# Patient Record
Sex: FEMALE | Race: AMERICAN INDIAN OR ALASKA NATIVE
[De-identification: names, ages, dates, MRNs, and addresses within clinical notes are randomized per-mention and may not be internally consistent; named-entity substitution may affect disease eponyms.]

---

## 2018-07-14 ENCOUNTER — HOSPITAL ENCOUNTER (EMERGENCY)
Dept: HOSPITAL 5 - ED | Age: 23
LOS: 1 days | Discharge: LEFT BEFORE DISCHARGE | End: 2018-07-15
Payer: MEDICAID

## 2018-07-14 DIAGNOSIS — Z53.21: ICD-10-CM

## 2018-07-14 DIAGNOSIS — R10.9: Primary | ICD-10-CM

## 2018-07-14 PROCEDURE — 81001 URINALYSIS AUTO W/SCOPE: CPT

## 2018-07-14 PROCEDURE — 85007 BL SMEAR W/DIFF WBC COUNT: CPT

## 2018-07-14 PROCEDURE — 83690 ASSAY OF LIPASE: CPT

## 2018-07-14 PROCEDURE — 36415 COLL VENOUS BLD VENIPUNCTURE: CPT

## 2018-07-14 PROCEDURE — 80053 COMPREHEN METABOLIC PANEL: CPT

## 2018-07-14 PROCEDURE — 84703 CHORIONIC GONADOTROPIN ASSAY: CPT

## 2018-07-14 PROCEDURE — 85025 COMPLETE CBC W/AUTO DIFF WBC: CPT

## 2018-07-15 VITALS — DIASTOLIC BLOOD PRESSURE: 77 MMHG | SYSTOLIC BLOOD PRESSURE: 121 MMHG

## 2018-07-15 LAB
%HYPO/RBC NFR BLD AUTO: (no result) %
ALBUMIN SERPL-MCNC: 4.5 G/DL (ref 3.9–5)
ALT SERPL-CCNC: 17 UNITS/L (ref 7–56)
ANISOCYTOSIS BLD QL SMEAR: (no result)
BAND NEUTROPHILS # (MANUAL): 0 K/MM3
BILIRUB UR QL STRIP: (no result)
BLOOD UR QL VISUAL: (no result)
BUN SERPL-MCNC: 10 MG/DL (ref 7–17)
BUN/CREAT SERPL: 14 %
CALCIUM SERPL-MCNC: 9.6 MG/DL (ref 8.4–10.2)
HCT VFR BLD CALC: 42.7 % (ref 30.3–42.9)
HEMOLYSIS INDEX: 6
HGB BLD-MCNC: 14.5 GM/DL (ref 10.1–14.3)
MCH RBC QN AUTO: 33 PG (ref 28–32)
MCHC RBC AUTO-ENTMCNC: 34 % (ref 30–34)
MCV RBC AUTO: 98 FL (ref 79–97)
MUCOUS THREADS #/AREA URNS HPF: (no result) /HPF
MYELOCYTES # (MANUAL): 0 K/MM3
PH UR STRIP: 5 [PH] (ref 5–7)
PLATELET # BLD: 204 K/MM3 (ref 140–440)
PROMYELOCYTES # (MANUAL): 0 K/MM3
PROT UR STRIP-MCNC: (no result) MG/DL
RBC # BLD AUTO: 4.34 M/MM3 (ref 3.65–5.03)
RBC #/AREA URNS HPF: 3 /HPF (ref 0–6)
TOTAL CELLS COUNTED BLD: 100
UROBILINOGEN UR-MCNC: < 2 MG/DL (ref ?–2)
WBC #/AREA URNS HPF: 1 /HPF (ref 0–6)

## 2019-08-02 ENCOUNTER — HOSPITAL ENCOUNTER (EMERGENCY)
Dept: HOSPITAL 5 - ED | Age: 24
Discharge: HOME | End: 2019-08-02
Payer: SELF-PAY

## 2019-08-02 VITALS — DIASTOLIC BLOOD PRESSURE: 71 MMHG | SYSTOLIC BLOOD PRESSURE: 115 MMHG

## 2019-08-02 DIAGNOSIS — Z90.49: ICD-10-CM

## 2019-08-02 DIAGNOSIS — E16.2: ICD-10-CM

## 2019-08-02 DIAGNOSIS — F17.200: ICD-10-CM

## 2019-08-02 DIAGNOSIS — Z79.899: ICD-10-CM

## 2019-08-02 DIAGNOSIS — Z88.6: ICD-10-CM

## 2019-08-02 DIAGNOSIS — Z98.890: ICD-10-CM

## 2019-08-02 DIAGNOSIS — J45.909: Primary | ICD-10-CM

## 2019-08-02 LAB
ALBUMIN SERPL-MCNC: 4.1 G/DL (ref 3.9–5)
ALT SERPL-CCNC: 15 UNITS/L (ref 7–56)
BASOPHILS # (AUTO): 0 K/MM3 (ref 0–0.1)
BASOPHILS NFR BLD AUTO: 0.4 % (ref 0–1.8)
BUN SERPL-MCNC: 7 MG/DL (ref 7–17)
BUN/CREAT SERPL: 9 %
CALCIUM SERPL-MCNC: 8.9 MG/DL (ref 8.4–10.2)
EOSINOPHIL # BLD AUTO: 0.1 K/MM3 (ref 0–0.4)
EOSINOPHIL NFR BLD AUTO: 1.2 % (ref 0–4.3)
HCT VFR BLD CALC: 38.1 % (ref 30.3–42.9)
HEMOLYSIS INDEX: 9
HGB BLD-MCNC: 13 GM/DL (ref 10.1–14.3)
LYMPHOCYTES # BLD AUTO: 0.6 K/MM3 (ref 1.2–5.4)
LYMPHOCYTES NFR BLD AUTO: 11.6 % (ref 13.4–35)
MCHC RBC AUTO-ENTMCNC: 34 % (ref 30–34)
MCV RBC AUTO: 97 FL (ref 79–97)
MONOCYTES # (AUTO): 0.3 K/MM3 (ref 0–0.8)
MONOCYTES % (AUTO): 6.7 % (ref 0–7.3)
PLATELET # BLD: 182 K/MM3 (ref 140–440)
RBC # BLD AUTO: 3.94 M/MM3 (ref 3.65–5.03)

## 2019-08-02 PROCEDURE — 82140 ASSAY OF AMMONIA: CPT

## 2019-08-02 PROCEDURE — 71045 X-RAY EXAM CHEST 1 VIEW: CPT

## 2019-08-02 PROCEDURE — 80053 COMPREHEN METABOLIC PANEL: CPT

## 2019-08-02 PROCEDURE — 36415 COLL VENOUS BLD VENIPUNCTURE: CPT

## 2019-08-02 PROCEDURE — 96375 TX/PRO/DX INJ NEW DRUG ADDON: CPT

## 2019-08-02 PROCEDURE — 85025 COMPLETE CBC W/AUTO DIFF WBC: CPT

## 2019-08-02 PROCEDURE — 87040 BLOOD CULTURE FOR BACTERIA: CPT

## 2019-08-02 PROCEDURE — 99284 EMERGENCY DEPT VISIT MOD MDM: CPT

## 2019-08-02 PROCEDURE — 96365 THER/PROPH/DIAG IV INF INIT: CPT

## 2019-08-02 PROCEDURE — 94644 CONT INHLJ TX 1ST HOUR: CPT

## 2019-08-02 NOTE — EMERGENCY DEPARTMENT REPORT
ED Shortness of Breath HPI





- General


Chief Complaint: Dyspnea/Respdistress


Stated Complaint: COUGH


Time Seen by Provider: 08/02/19 20:27


Source: patient, EMS


Mode of arrival: Stretcher


Limitations: No Limitations





- History of Present Illness


Initial Comments: 





Patient is 24-year-old female with history of asthma.  Patient brought to the 

emergency room via EMS with difficulty breathing and cough for the last 3 days. 

Patient is a heavy smoker.  Patient also found to be having a temperature of 

100.4.  She denied any chest pain.  No nausea or vomiting.


MD Complaint: shortness of breath, cough


-: days(s) (3)


Severity: moderate


Known History Of: asthma


Context: recent URI





- Related Data


                                  Previous Rx's











 Medication  Instructions  Recorded  Last Taken  Type


 


HYDROcodone/APAP 5-325 [[place] 1 each PO Q6HR PRN #20 tablet 10/27/18 Unknown Rx





5/325]    


 


metroNIDAZOLE [Flagyl] 500 mg PO Q12HR #14 tab 01/04/19 Unknown Rx


 


traMADol [Ultram] 50 mg PO Q6HR PRN #10 tablet 01/04/19 Unknown Rx











                                    Allergies











Allergy/AdvReac Type Severity Reaction Status Date / Time


 


ibuprofen Allergy  Swelling Verified 07/15/18 02:17














ED Review of Systems


ROS: 


Stated complaint: COUGH


Other details as noted in HPI





Comment: All other systems reviewed and negative


Constitutional: chills, fever


Respiratory: cough, shortness of breath, SOB with exertion, SOB at rest, 

wheezing.  denies: orthopnea, stridor


Cardiovascular: palpitations, dyspnea on exertion.  denies: chest pain, 

orthopnea


Gastrointestinal: denies: abdominal pain, nausea, vomiting, diarrhea, 

constipation, hematemesis, melena, hematochezia


Genitourinary: denies: dysuria


Musculoskeletal: denies: back pain


Neurological: denies: headache, weakness, numbness, paresthesias, confusion, 

abnormal gait





ED Past Medical Hx





- Past Medical History


Previous Medical History?: Yes


Hx Asthma: Yes


Additional medical history: Hypoglycemia





- Surgical History


Hx Cholecystectomy: Yes


Additional Surgical History: Surgery on Pancrease





- Social History


Smoking Status: Current Every Day Smoker


Substance Use Type: None





- Medications


Home Medications: 


                                Home Medications











 Medication  Instructions  Recorded  Confirmed  Last Taken  Type


 


HYDROcodone/APAP 5-325 [[place] 1 each PO Q6HR PRN #20 tablet 10/27/18  Unknown Rx





5/325]     


 


metroNIDAZOLE [Flagyl] 500 mg PO Q12HR #14 tab 01/04/19  Unknown Rx


 


traMADol [Ultram] 50 mg PO Q6HR PRN #10 tablet 01/04/19  Unknown Rx














ED Physical Exam





- General


Limitations: No Limitations


General appearance: alert, in distress





- Head


Head exam: Present: atraumatic, normocephalic, normal inspection





- Eye


Eye exam: Present: normal appearance, PERRL





- ENT


ENT exam: Present: normal exam, normal orophraynx, mucous membranes moist





- Neck


Neck exam: Present: normal inspection, full ROM.  Absent: tenderness, meningi

smus, lymphadenopathy, thyromegaly





- Respiratory


Respiratory exam: Present: respiratory distress, wheezes, rales, rhonchi, 

accessory muscle use, decreased breath sounds, prolonged expiratory.  Absent: 

stridor





- Cardiovascular


Cardiovascular Exam: Present: tachycardia





- GI/Abdominal


GI/Abdominal exam: Present: soft, normal bowel sounds.  Absent: distended, 

tenderness, guarding, rebound, rigid, organomegaly, mass, bruit, pulsatile mass,

 hernia





- Extremities Exam


Extremities exam: Present: normal inspection, full ROM, normal capillary refill.

  Absent: pedal edema, calf tenderness





- Back Exam


Back exam: Present: normal inspection, full ROM.  Absent: CVA tenderness (R), 

CVA tenderness (L)





- Neurological Exam


Neurological exam: Present: alert, oriented X3, CN II-XII intact, normal gait





- Skin


Skin exam: Present: warm, intact, normal color





ED Course


                                   Vital Signs











  08/02/19 08/02/19 08/02/19





  20:30 21:07 22:00


 


Temperature 100.8 F H  99 F


 


Pulse Rate 112 H  68


 


Pulse Rate [ 101 H 100 H 





Anterior]   


 


Respiratory 24  18





Rate   


 


Respiratory 18 19 





Rate [Anterior]   


 


Blood Pressure 165/92  118/78





[Left]   


 


O2 Sat by Pulse 98  100





Oximetry   














  08/02/19





  22:29


 


Temperature 


 


Pulse Rate 


 


Pulse Rate [ 





Anterior] 


 


Respiratory 18





Rate 


 


Respiratory 





Rate [Anterior] 


 


Blood Pressure 





[Left] 


 


O2 Sat by Pulse 





Oximetry 














ED Medical Decision Making





- Lab Data


Result diagrams: 


                                 08/02/19 20:44





                                 08/02/19 20:44





- Radiology Data


Radiology results: report reviewed





Chest x-ray is unremarkable.





- Medical Decision Making





Patient is 24-year-old female with history of asthma.  Patient brought to the 

emergency room via EMS with difficulty breathing and cough for the last 3 days. 

 Patient is a heavy smoker.  Patient also found to be having a temperature of 

100.4.  She denied any chest pain.  No nausea or vomiting.





Patient received albuterol 5 mg and Atrovent 0.5 mg.  She also received Solu-

Medrol and Zosyn.  Patient stated that she is feeling much better.  Labs 

reviewed and is unremarkable.  Chest x-ray is unremarkable.  Patient's symptoms 

is consistent with asthmatic bronchitis.  Patient will be discharged home with 

Levaquin, prednisone and Robitussin.  Patient advised to follow-up with her 

primary care physician in the next 2-3 days and to return to the ER if symptoms 

are not improved.  Patient is also counseled about smoking cessation.


Critical care attestation.: 


If time is entered above; I have spent that time in minutes in the direct care 

of this critically ill patient, excluding procedure time.








ED Disposition


Clinical Impression: 


 Asthmatic bronchitis, Shortness of breath





Disposition: DC-01 TO HOME OR SELFCARE


Is pt being admited?: No


Condition: Stable


Instructions:  Acute Bronchitis (ED), Chronic Bronchitis (ED)


Referrals: 


BALTAZAR PIERCE MD [Primary Care Provider] - 3-5 Days

## 2019-08-02 NOTE — XRAY REPORT
CHEST 1 VIEW 8/2/2019 8:46 PM



INDICATION / CLINICAL INFORMATION:

SOB.



COMPARISON: 

None available.



FINDINGS:



SUPPORT DEVICES: None.



HEART / MEDIASTINUM: No significant abnormality. 



LUNGS / PLEURA: No significant pulmonary or pleural abnormality. No pneumothorax. 



ADDITIONAL FINDINGS: No significant additional findings.



IMPRESSION:

1. No acute findings.



Signer Name: Haresh Thorpe MD 

Signed: 8/2/2019 9:17 PM

 Workstation Name: [id number]

## 2019-08-16 ENCOUNTER — HOSPITAL ENCOUNTER (EMERGENCY)
Dept: HOSPITAL 5 - ED | Age: 24
LOS: 1 days | Discharge: HOME | End: 2019-08-17
Payer: SELF-PAY

## 2019-08-16 DIAGNOSIS — F12.10: ICD-10-CM

## 2019-08-16 DIAGNOSIS — J45.909: ICD-10-CM

## 2019-08-16 DIAGNOSIS — F17.200: ICD-10-CM

## 2019-08-16 DIAGNOSIS — G43.909: Primary | ICD-10-CM

## 2019-08-16 DIAGNOSIS — Z79.899: ICD-10-CM

## 2019-08-16 LAB
BILIRUB UR QL STRIP: (no result)
BLOOD UR QL VISUAL: (no result)
BUN SERPL-MCNC: 10 MG/DL (ref 7–17)
BUN/CREAT SERPL: 14 %
CALCIUM SERPL-MCNC: 9.6 MG/DL (ref 8.4–10.2)
HCT VFR BLD CALC: 42.5 % (ref 30.3–42.9)
HEMOLYSIS INDEX: 8
HGB BLD-MCNC: 14.5 GM/DL (ref 10.1–14.3)
MCHC RBC AUTO-ENTMCNC: 34 % (ref 30–34)
MCV RBC AUTO: 97 FL (ref 79–97)
MUCOUS THREADS #/AREA URNS HPF: (no result) /HPF
PH UR STRIP: 5 [PH] (ref 5–7)
PLATELET # BLD: 209 K/MM3 (ref 140–440)
PROT UR STRIP-MCNC: (no result) MG/DL
RBC # BLD AUTO: 4.41 M/MM3 (ref 3.65–5.03)
RBC #/AREA URNS HPF: 2 /HPF (ref 0–6)
UROBILINOGEN UR-MCNC: 2 MG/DL (ref ?–2)
WBC #/AREA URNS HPF: 1 /HPF (ref 0–6)

## 2019-08-16 PROCEDURE — 85027 COMPLETE CBC AUTOMATED: CPT

## 2019-08-16 PROCEDURE — G0480 DRUG TEST DEF 1-7 CLASSES: HCPCS

## 2019-08-16 PROCEDURE — 81025 URINE PREGNANCY TEST: CPT

## 2019-08-16 PROCEDURE — 93010 ELECTROCARDIOGRAM REPORT: CPT

## 2019-08-16 PROCEDURE — 82962 GLUCOSE BLOOD TEST: CPT

## 2019-08-16 PROCEDURE — 96366 THER/PROPH/DIAG IV INF ADDON: CPT

## 2019-08-16 PROCEDURE — 96375 TX/PRO/DX INJ NEW DRUG ADDON: CPT

## 2019-08-16 PROCEDURE — 84702 CHORIONIC GONADOTROPIN TEST: CPT

## 2019-08-16 PROCEDURE — 99285 EMERGENCY DEPT VISIT HI MDM: CPT

## 2019-08-16 PROCEDURE — 83735 ASSAY OF MAGNESIUM: CPT

## 2019-08-16 PROCEDURE — 36415 COLL VENOUS BLD VENIPUNCTURE: CPT

## 2019-08-16 PROCEDURE — 80048 BASIC METABOLIC PNL TOTAL CA: CPT

## 2019-08-16 PROCEDURE — 80320 DRUG SCREEN QUANTALCOHOLS: CPT

## 2019-08-16 PROCEDURE — 80307 DRUG TEST PRSMV CHEM ANLYZR: CPT

## 2019-08-16 PROCEDURE — 81001 URINALYSIS AUTO W/SCOPE: CPT

## 2019-08-16 PROCEDURE — 96365 THER/PROPH/DIAG IV INF INIT: CPT

## 2019-08-16 PROCEDURE — 82550 ASSAY OF CK (CPK): CPT

## 2019-08-16 PROCEDURE — 70498 CT ANGIOGRAPHY NECK: CPT

## 2019-08-16 PROCEDURE — 70450 CT HEAD/BRAIN W/O DYE: CPT

## 2019-08-16 PROCEDURE — 93005 ELECTROCARDIOGRAM TRACING: CPT

## 2019-08-16 PROCEDURE — 70496 CT ANGIOGRAPHY HEAD: CPT
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
## 2019-08-17 VITALS — SYSTOLIC BLOOD PRESSURE: 107 MMHG | DIASTOLIC BLOOD PRESSURE: 69 MMHG

## 2019-08-17 LAB
BENZODIAZEPINES SCREEN,URINE: (no result)
METHADONE SCREEN,URINE: (no result)
OPIATE SCREEN,URINE: (no result)

## 2019-08-17 NOTE — CAT SCAN REPORT
CT head/brain wo con 



INDICATION / CLINICAL INFORMATION:

Headache and syncope. Migraine headaches for 2 months.



TECHNIQUE:

All CT scans at this location are performed using CT dose reduction for ALARA by means of automated e
xposure control. 



COMPARISON:

None available.



FINDINGS:

The ventricular system is normal in size and configuration. No focal lesion or mass effect is seen. T
here is no evidence of intracranial hemorrhage or major vessel occlusion. The visualized paranasal si
nuses and mastoid air cells are clear. The calvarium is intact.



IMPRESSION: No acute abnormality. 



Signer Name: Carloz Guadarrama MD 

Signed: 8/17/2019 5:35 AM

 Workstation Name: [email or web address]

## 2019-08-17 NOTE — EMERGENCY DEPARTMENT REPORT
ED General Adult HPI





- General


Chief complaint: Syncope


Stated complaint: MIGRAINE,VOMITING,EAR RINGING,PELVIC PAIN


Time Seen by Provider: 08/16/19 20:45


Source: patient, RN notes reviewed, old records reviewed


Mode of arrival: Ambulatory


Limitations: No Limitations





- History of Present Illness


Initial comments: 





This is a 24-year-old female.  This patient is not known to this provider 

previously.  Past medical history includes chronic migraines, chronic marijuana 

use, and she reports that she does not have a primary care doctor.  She reports 

being diagnosed with migraines well child.  She reports that she used to take 

chronic migraine medication, including Excedrin, and tramadol, neither of which 

really worked for her.  She presents to the ER today with multiple complaints.  

Her first complaint is migraine headache.  The headache is frontal and right-

sided retro-orbital.  The headache is present every day for the past few months.

 The headache is not sudden or thunderclap in nature.  The headache did not 

reach maximal intensity within an hour.  The headache is associated with 

nonspecific changes in right-sided vision, every day, and typically worsens with

exposure to light and sound.  Her headache today similar to prior headaches.  It

is not the most intense headache of her life.





She endorses a secondary complaint of ringing and whooshing sound in her right 

ear.  This has been present for a few months to almost a year.  It is painless, 

and she denies loss of auditory acuity.





She endorses additional complaint of loss of consciousness.  She reports that 

she was walking outside in the hot weather, when she lost consciousness.  Prior 

to the event, she denies chest pain, shortness of breath, or sudden severe 

thunderclap headache.  She thinks that she did not drink much water today.





She endorses an additional complaint of nausea, which is now resolved.  She 

endorses an additional complaint of vaginal discharge and discomfort for a few 

months.  She denies dysuria.








-: week(s), month(s), This afternoon


Location: head


Severity scale (0 -10): 10


Quality: other


Consistency: other


Improves with: other


Worsens with: other





- Related Data


                                  Previous Rx's











 Medication  Instructions  Recorded  Last Taken  Type


 


HYDROcodone/APAP 5-325 [[place] 1 each PO Q6HR PRN #20 tablet 10/27/18 Unknown Rx





5/325]    


 


metroNIDAZOLE [Flagyl] 500 mg PO Q12HR #14 tab 01/04/19 Unknown Rx


 


traMADol [Ultram] 50 mg PO Q6HR PRN #10 tablet 01/04/19 Unknown Rx


 


ALBUTEROL NEB's [Proventil 0.083% 2.5 mg IH TID PRN #30 nebu 08/02/19 Unknown Rx





NEBS]    


 


Prednisone [predniSONE 10 mg 10 mg PO .TAPER #1 tab.ds.pk 08/02/19 Unknown Rx





(6-Day Pack, 21 Tabs)]    


 


guaiFENesin/CODEINE [Robitussin AC] 10 ml PO TID PRN #100 ml 08/02/19 Unknown Rx


 


levoFLOXacin [Levaquin TAB] 500 mg PO QDAY #7 tablet 08/02/19 Unknown Rx


 


Butalb/Acetaminophen/Caffeine 1 cap PO Q6HR PRN #15 cap 08/17/19 Unknown Rx





[Fioricet -40 mg CAP]    


 


Metoclopramide [Reglan] 10 mg PO QID PRN #30 tablet 08/17/19 Unknown Rx











                                    Allergies











Allergy/AdvReac Type Severity Reaction Status Date / Time


 


ibuprofen Allergy  Swelling Verified 07/15/18 02:17














ED Review of Systems


ROS: 


Stated complaint: MIGRAINE,VOMITING,EAR RINGING,PELVIC PAIN


Other details as noted in HPI





Constitutional: denies: fever


Eyes: vision change.  denies: eye discharge


ENT: denies: congestion


Cardiovascular: syncope


Gastrointestinal: abdominal pain (patient endorses abdominal cramping, 

intermittently, which is chronic)


Genitourinary: discharge


Musculoskeletal: myalgia


Neurological: headache


Psychiatric: anxiety





ED Past Medical Hx





- Past Medical History


Previous Medical History?: Yes


Hx Asthma: Yes


Additional medical history: Hypoglycemia





- Surgical History


Past Surgical History?: Yes


Hx Cholecystectomy: Yes


Additional Surgical History: Surgery on Pancrease





- Social History


Smoking Status: Current Every Day Smoker


Substance Use Type: Marijuana





- Medications


Home Medications: 


                                Home Medications











 Medication  Instructions  Recorded  Confirmed  Last Taken  Type


 


HYDROcodone/APAP 5-325 [[place] 1 each PO Q6HR PRN #20 tablet 10/27/18  Unknown Rx





5/325]     


 


metroNIDAZOLE [Flagyl] 500 mg PO Q12HR #14 tab 01/04/19  Unknown Rx


 


traMADol [Ultram] 50 mg PO Q6HR PRN #10 tablet 01/04/19  Unknown Rx


 


ALBUTEROL NEB's [Proventil 0.083% 2.5 mg IH TID PRN #30 nebu 08/02/19  Unknown 

Rx





NEBS]     


 


Prednisone [predniSONE 10 mg 10 mg PO .TAPER #1 tab.ds.pk 08/02/19  Unknown Rx





(6-Day Pack, 21 Tabs)]     


 


guaiFENesin/CODEINE [Robitussin AC] 10 ml PO TID PRN #100 ml 08/02/19  Unknown 

Rx


 


levoFLOXacin [Levaquin TAB] 500 mg PO QDAY #7 tablet 08/02/19  Unknown Rx


 


Butalb/Acetaminophen/Caffeine 1 cap PO Q6HR PRN #15 cap 08/17/19  Unknown Rx





[Fioricet -40 mg CAP]     


 


Metoclopramide [Reglan] 10 mg PO QID PRN #30 tablet 08/17/19  Unknown Rx














ED Physical Exam





- General


Limitations: No Limitations


General appearance: alert, in no apparent distress





- Head


Head exam: Present: atraumatic, normocephalic





- Eye


Eye exam: Present: normal appearance, PERRL, EOMI, other (visual acuity intact 

to finger counting, color perception, reading at a close distance).  Absent: 

nystagmus





- ENT


ENT exam: Present: normal exam, normal orophraynx, mucous membranes moist, TM's 

normal bilaterally, normal external ear exam





- Neck


Neck exam: Present: normal inspection, full ROM.  Absent: tenderness, 

meningismus





- Respiratory


Respiratory exam: Present: normal lung sounds bilaterally.  Absent: respiratory 

distress





- Cardiovascular


Cardiovascular Exam: Present: regular rate, normal rhythm, normal heart sounds. 

 Absent: bradycardia, tachycardia, irregular rhythm, systolic murmur, diastolic 

murmur, rubs, gallop





- GI/Abdominal


GI/Abdominal exam: Present: soft.  Absent: distended, tenderness, guarding, re

bound, rigid, pulsatile mass





- Extremities Exam


Extremities exam: Present: normal inspection, full ROM, other (2+ pulses noted 

in the bilateral upper, lower extremities.  Compartments soft.  No long bony 

tenderness.  The pelvis is stable.).  Absent: pedal edema, joint swelling, calf 

tenderness





- Back Exam


Back exam: Present: normal inspection, full ROM.  Absent: tenderness, CVA 

tenderness (R), CVA tenderness (L), paraspinal tenderness, vertebral tenderness





- Neurological Exam


Neurological exam: Present: alert, oriented X3, normal gait (there is no past 

pointing.  There is normal heel to shin.  There is normal gait.  There is 

negative pronator drift.), other (Extraocular movements intact.  Tongue midline.

  No facial droop.  Facial sensation intact to light touch in the V1, V2, V3 

distribution bilaterally.  5 and 5 strength in 4 extremities..  Sensation is 

intact to light touch in 4 extremities.).  Absent: motor sensory deficit





- Psychiatric


Psychiatric exam: Present: anxious





- Skin


Skin exam: Present: warm, dry, intact, normal color.  Absent: rash





ED Course


                                   Vital Signs











  08/16/19 08/17/19 08/17/19





  20:45 02:30 03:20


 


Temperature 98.4 F 98.1 F 


 


Pulse Rate 98 H 83 


 


Respiratory 18 16 16





Rate   


 


Blood Pressure 141/74  


 


Blood Pressure  125/76 





[Left]   


 


O2 Sat by Pulse 99 100 





Oximetry   














  08/17/19





  05:00


 


Temperature 


 


Pulse Rate 77


 


Respiratory 16





Rate 


 


Blood Pressure 


 


Blood Pressure 107/69





[Left] 


 


O2 Sat by Pulse 98





Oximetry 














- Reevaluation(s)


Reevaluation #1: 





08/17/19 03:52


Differential diagnosis, including not limited to: Orthostasis, dehydration, heat

 exhaustion, vertebral basilar migraine, complex migraine, carotid dissection, 

chronic vaginitis








Assessment and plan: 24-year-old female with multiple complaints





complaint #1, chronic headache, resolved chronic visual disturbance





Patient reports chronic headaches, she has a GCS of 15, with an NIH score of 0, 

walks with a steady gait, has no cerebellar signs, has no carotid bruit, has an 

unremarkable ENT exam





We'll treat the patient's headache supportively and symptomatically.  CT scan of

 the brain, CT angiogram has been ordered, history and physical not consistent 

with subarachnoid hemorrhage.  Patient can follow up with an outpatient primary 

care doctor or neurologist if her initial ER workup is unremarkable.





Complaint #2, unprovoked syncope.  Patient is not tachycardic or hypoxic, endor

ses no pulmonary embolus or DVT risk factors, is low risk by well's criteria, 

perc negative


EKG is unchanged 2.  Most likely a combination of orthostasis, vagal event, 

chronic cannabis use, possible components of vertebrobasilar migraine.  We will 

give IV fluids, placed on a cardiac monitor, patient is counseled to discontinue

 cannabis consumption.  Patient has been observed in the ER for a few hours, 

without recurrent event at this time.  No arrhythmias noted.





Complaint #3, vaginal discharge and discomfort for a few months.  She has no 

lower abdominal tenderness, rebound or guarding.  Her urinalysis is not 

consistent with acutely infectious etiology.  Vaginal discharge a few months 

does not constitute an emergency medical condition, and she can follow up in 

outpatient primary care doctor or gynecologist for this complaint.


Reevaluation #2: 





08/17/19 05:29











no syncopal episodes thus far


08/17/19 06:04





Reevaluation #3: 





08/17/19 06:04


ct head negative


cta head neck negative








patient here for over 9 hours without any documented events











she may be discharged with follow up instructions





ED Medical Decision Making





- Lab Data


Result diagrams: 


                                 08/16/19 21:11





                                 08/16/19 21:11








                                   Vital Signs











  08/16/19 08/17/19 08/17/19





  20:45 02:30 03:20


 


Temperature 98.4 F 98.1 F 


 


Pulse Rate 98 H 83 


 


Respiratory 18 16 16





Rate   


 


Blood Pressure 141/74  


 


Blood Pressure  125/76 





[Left]   


 


O2 Sat by Pulse 99 100 





Oximetry   











                                   Lab Results











  08/16/19 08/16/19 08/16/19 Range/Units





  20:59 21:11 21:11 


 


WBC   6.9   (4.5-11.0)  K/mm3


 


RBC   4.41   (3.65-5.03)  M/mm3


 


Hgb   14.5 H   (10.1-14.3)  gm/dl


 


Hct   42.5   (30.3-42.9)  %


 


MCV   97   (79-97)  fl


 


MCH   33 H   (28-32)  pg


 


MCHC   34   (30-34)  %


 


RDW   13.6   (13.2-15.2)  %


 


Plt Count   209   (140-440)  K/mm3


 


Sodium    141  (137-145)  mmol/L


 


Potassium    3.9  (3.6-5.0)  mmol/L


 


Chloride    104.8  ()  mmol/L


 


Carbon Dioxide    25  (22-30)  mmol/L


 


Anion Gap    15  mmol/L


 


BUN    10  (7-17)  mg/dL


 


Creatinine    0.7  (0.7-1.2)  mg/dL


 


Estimated GFR    > 60  ml/min


 


BUN/Creatinine Ratio    14  %


 


Glucose    89  ()  mg/dL


 


POC Glucose  110 H    ()  


 


Calcium    9.6  (8.4-10.2)  mg/dL


 


Magnesium     (1.7-2.3)  mg/dL


 


Total Creatine Kinase     ()  units/L


 


Urine Color     (Yellow)  


 


Urine Turbidity     (Clear)  


 


Urine pH     (5.0-7.0)  


 


Ur Specific Gravity     (1.003-1.030)  


 


Urine Protein     (Negative)  mg/dL


 


Urine Glucose (UA)     (Negative)  mg/dL


 


Urine Ketones     (Negative)  mg/dL


 


Urine Blood     (Negative)  


 


Urine Nitrite     (Negative)  


 


Ur Reducing Substances     


 


Urine Bilirubin     (Negative)  


 


Urine Ictotest     


 


Urine Urobilinogen     (<2.0)  mg/dL


 


Ur Leukocyte Esterase     (Negative)  


 


Urine WBC (Auto)     (0.0-6.0)  /HPF


 


Urine RBC (Auto)     (0.0-6.0)  /HPF


 


U Epithel Cells (Auto)     (0-13.0)  /HPF


 


Urine Mucus     /HPF


 


Urine HCG, Qual     (Negative)  


 


Salicylates     (2.8-20.0)  mg/dL


 


Plasma/Serum Alcohol     (0-0.07)  %














  08/16/19 08/17/19 08/17/19 Range/Units





  21:50 03:12 03:12 


 


WBC     (4.5-11.0)  K/mm3


 


RBC     (3.65-5.03)  M/mm3


 


Hgb     (10.1-14.3)  gm/dl


 


Hct     (30.3-42.9)  %


 


MCV     (79-97)  fl


 


MCH     (28-32)  pg


 


MCHC     (30-34)  %


 


RDW     (13.2-15.2)  %


 


Plt Count     (140-440)  K/mm3


 


Sodium     (137-145)  mmol/L


 


Potassium     (3.6-5.0)  mmol/L


 


Chloride     ()  mmol/L


 


Carbon Dioxide     (22-30)  mmol/L


 


Anion Gap     mmol/L


 


BUN     (7-17)  mg/dL


 


Creatinine     (0.7-1.2)  mg/dL


 


Estimated GFR     ml/min


 


BUN/Creatinine Ratio     %


 


Glucose     ()  mg/dL


 


POC Glucose     ()  


 


Calcium     (8.4-10.2)  mg/dL


 


Magnesium   1.90   (1.7-2.3)  mg/dL


 


Total Creatine Kinase   97   ()  units/L


 


Urine Color  Yellow    (Yellow)  


 


Urine Turbidity  Clear    (Clear)  


 


Urine pH  5.0    (5.0-7.0)  


 


Ur Specific Gravity  1.027    (1.003-1.030)  


 


Urine Protein  <15 mg/dl    (Negative)  mg/dL


 


Urine Glucose (UA)  Neg    (Negative)  mg/dL


 


Urine Ketones  Neg    (Negative)  mg/dL


 


Urine Blood  Neg    (Negative)  


 


Urine Nitrite  Neg    (Negative)  


 


Ur Reducing Substances  Not Reportable    


 


Urine Bilirubin  Neg    (Negative)  


 


Urine Ictotest  Not Reportable    


 


Urine Urobilinogen  2.0    (<2.0)  mg/dL


 


Ur Leukocyte Esterase  Neg    (Negative)  


 


Urine WBC (Auto)  1.0    (0.0-6.0)  /HPF


 


Urine RBC (Auto)  2.0    (0.0-6.0)  /HPF


 


U Epithel Cells (Auto)  2.0    (0-13.0)  /HPF


 


Urine Mucus  Few    /HPF


 


Urine HCG, Qual  Negative    (Negative)  


 


Salicylates    < 0.3 L  (2.8-20.0)  mg/dL


 


Plasma/Serum Alcohol     (0-0.07)  %














  08/17/19 Range/Units





  03:12 


 


WBC   (4.5-11.0)  K/mm3


 


RBC   (3.65-5.03)  M/mm3


 


Hgb   (10.1-14.3)  gm/dl


 


Hct   (30.3-42.9)  %


 


MCV   (79-97)  fl


 


MCH   (28-32)  pg


 


MCHC   (30-34)  %


 


RDW   (13.2-15.2)  %


 


Plt Count   (140-440)  K/mm3


 


Sodium   (137-145)  mmol/L


 


Potassium   (3.6-5.0)  mmol/L


 


Chloride   ()  mmol/L


 


Carbon Dioxide   (22-30)  mmol/L


 


Anion Gap   mmol/L


 


BUN   (7-17)  mg/dL


 


Creatinine   (0.7-1.2)  mg/dL


 


Estimated GFR   ml/min


 


BUN/Creatinine Ratio   %


 


Glucose   ()  mg/dL


 


POC Glucose   ()  


 


Calcium   (8.4-10.2)  mg/dL


 


Magnesium   (1.7-2.3)  mg/dL


 


Total Creatine Kinase   ()  units/L


 


Urine Color   (Yellow)  


 


Urine Turbidity   (Clear)  


 


Urine pH   (5.0-7.0)  


 


Ur Specific Gravity   (1.003-1.030)  


 


Urine Protein   (Negative)  mg/dL


 


Urine Glucose (UA)   (Negative)  mg/dL


 


Urine Ketones   (Negative)  mg/dL


 


Urine Blood   (Negative)  


 


Urine Nitrite   (Negative)  


 


Ur Reducing Substances   


 


Urine Bilirubin   (Negative)  


 


Urine Ictotest   


 


Urine Urobilinogen   (<2.0)  mg/dL


 


Ur Leukocyte Esterase   (Negative)  


 


Urine WBC (Auto)   (0.0-6.0)  /HPF


 


Urine RBC (Auto)   (0.0-6.0)  /HPF


 


U Epithel Cells (Auto)   (0-13.0)  /HPF


 


Urine Mucus   /HPF


 


Urine HCG, Qual   (Negative)  


 


Salicylates   (2.8-20.0)  mg/dL


 


Plasma/Serum Alcohol  < 0.01  (0-0.07)  %














- EKG Data


-: EKG Interpreted by Me


EKG shows normal: sinus rhythm


Rate: normal





- EKG Data





08/17/19 03:54


EKG #1 shows normal sinus, 80 bpm, normal axis, intervals, high left ventricular

 voltage, the EKG is not consistent with ST elevation myocardial infarction, it 

is unchanged from prior EKG from October 2018.





EKG #2 is unchanged from prior EKG.





- Radiology Data


Radiology results: pending


Critical care attestation.: 


If time is entered above; I have spent that time in minutes in the direct care 

of this critically ill patient, excluding procedure time.








ED Disposition


Clinical Impression: 


 History of syncope, Chronic headache, Marijuana use





Disposition: DC-01 TO HOME OR SELFCARE


Is pt being admited?: No


Does the pt Need Aspirin: No


Condition: Stable


Additional Instructions: 


Do not drive or operate motor vehicles for the next 6 months, or until cleared 

by a primary care doctor to do so.





Stop smoking marijuana.  Drinks 4-6 cups of water a day, every day.  Take the 

pain medication, headache medication, nausea medication as needed/directed.  

Follow up with a primary care doctor or neurologist within the next 2 weeks.  

Return to the emergency room right away with Breaux, worsened or different 

symptoms, or symptoms not present on the initial emergency room evaluation.





Make certain to get at least 8 hours of good quality uninterrupted sleep every 

night, and avoid stimulation at nighttime, including video games, computer 

screens, and excessive cell phone use.








Recommend patient limits computer use and screen time to work-related tasks and 

essential tasks only.








Recommend follow-up with an outpatient primary care doctor or gynecologist 

within the next 3-4 weeks for chronic gynecologic discharge and discomfort.


Prescriptions: 


Butalb/Acetaminophen/Caffeine [Fioricet -40 mg CAP] 1 cap PO Q6HR PRN #15 

cap


 PRN Reason: Headache


Metoclopramide [Reglan] 10 mg PO QID PRN #30 tablet


 PRN Reason: Headache


Referrals: 


[facility] [Primary Care Provider] - 3-5 Days


MIMI LEBLANC MD [Staff Physician] - 3-5 Days


SHANIKA FLORES MD [Staff Physician] - 3-5 Days


[facility] [Provider Group] - 3-5 Days


[facility] [Provider Group] - 3-5 Days


[facility] [Provider Group] - 3-5 Days

## 2019-08-17 NOTE — CAT SCAN REPORT
CTA neck without and with intravenous contrast material

CLINICAL HISTORY:



headache syncope



TECHNIQUE:



Following acquisition of a timing bolus 0.625 mm thick contiguous axial scans were obtained from aort
ic arch to the skull base during rapid bolus intravenous contrast infusion. In addition to evaluation
 of axial source images multiplanar reconstructions were produced and reviewed for this report. 3 jenny
ne MIP reconstructions were produced and reviewed for this examination.



FINDINGS:



No abnormalities are seen at the origins of the great vessels.



Common carotid arteries, carotid bifurcations and cervical portions of the internal carotid arteries 
all have a normal appearance. There is no indication of hemodynamically significant stenosis.



Normal and symmetrical vertebral arteries are present. There is no indication of stenosis along the c
ourse of the vertebral arteries. Basilar artery is not included on this study.



The degree of stenosis, if any, is determined utilizing NASCET like criteria.  In this case there is 
no indication of hemodynamically significant stenosis.



Evaluation of the nonvascular soft tissue structures reveal no abnormality. There is no indication of
 cervical lymphadenopathy. No abnormalities are seen along the course of the airway. Visualized porti
ons of the parotid glands and the submandibular salivary glands have a normal appearance. Thyroid gla
nd has a normal appearance. Evaluation of the lung apices reveals no evidence of lung nodule or infil
trate. Evaluation of the cervical spine revealed no significant abnormalities.         



IMPRESSION:



1.  Normal CTA neck. 





Contrast dose report:



Omnipaque 350:  100 ml, administered intravenously



All CT examinations performed at this facility utilize modulated dose reduction, iterative reconstruc
tion or weight-based dosing, as appropriate, to obtain a radiation dose which is as low as can reason
ably be achieved.



Signer Name: Mk Bajwa MD 

Signed: 8/17/2019 5:54 AM

 Workstation Name: [id number]

## 2019-08-17 NOTE — CAT SCAN REPORT
CTA head with intravenous contrast

CLINICAL HISTORY:



headache, syncope, whooshing sound in right ear



TECHNIQUE:



0.625 mm thick contiguous axial scans were obtained from the skull base to the skull vertex during ra
pid bolus administration of intravenous contrast material. Multiplanar reconstructions were produced 
in the coronal and sagittal planes. In addition 3 plane MIP instructions were produced and reviewed f
or this report. The axial source images and reconstructed images were reviewed for this report.



All CT scans at this location are performed using CT dose reduction for ALARA by means of automated e
xposure control.



FINDINGS:



The caliber of the intracranial vessels is normal throughout. There is no indication of intracranial 
stenosis or large vessel occlusion. There is no indication of vasculitis.



There is no evidence of aneurysm or other vascular malformation.



IMPRESSION:



No abnormalities are identified on CTA head.



The study was performed and 0356 hours. I was notified by Sahara, the patient's CT technologist, that t
he examination was ready for evaluation at about 0440 hours. The reason for this delay is unknown to 
me.



CONTRAST DOSE REPORT:



Omnipaque 350: 100 ml administered intravenously.



Signer Name: Mk Bajwa MD 

Signed: 8/17/2019 5:51 AM

 Workstation Name: [id number]

## 2019-11-02 ENCOUNTER — HOSPITAL ENCOUNTER (EMERGENCY)
Dept: HOSPITAL 5 - ED | Age: 24
LOS: 1 days | Discharge: HOME | End: 2019-11-03
Payer: MEDICAID

## 2019-11-02 DIAGNOSIS — O00.90: Primary | ICD-10-CM

## 2019-11-02 DIAGNOSIS — Z3A.01: ICD-10-CM

## 2019-11-02 DIAGNOSIS — J45.909: ICD-10-CM

## 2019-11-02 LAB
BASOPHILS # (AUTO): 0 K/MM3 (ref 0–0.1)
BASOPHILS NFR BLD AUTO: 0.4 % (ref 0–1.8)
EOSINOPHIL # BLD AUTO: 0 K/MM3 (ref 0–0.4)
EOSINOPHIL NFR BLD AUTO: 1 % (ref 0–4.3)
HCT VFR BLD CALC: 39.8 % (ref 30.3–42.9)
HGB BLD-MCNC: 13.5 GM/DL (ref 10.1–14.3)
LYMPHOCYTES # BLD AUTO: 2.1 K/MM3 (ref 1.2–5.4)
LYMPHOCYTES NFR BLD AUTO: 41.8 % (ref 13.4–35)
MCHC RBC AUTO-ENTMCNC: 34 % (ref 30–34)
MCV RBC AUTO: 96 FL (ref 79–97)
MONOCYTES # (AUTO): 0.5 K/MM3 (ref 0–0.8)
MONOCYTES % (AUTO): 10.8 % (ref 0–7.3)
PLATELET # BLD: 206 K/MM3 (ref 140–440)
RBC # BLD AUTO: 4.17 M/MM3 (ref 3.65–5.03)

## 2019-11-02 PROCEDURE — 86850 RBC ANTIBODY SCREEN: CPT

## 2019-11-02 PROCEDURE — 86901 BLOOD TYPING SEROLOGIC RH(D): CPT

## 2019-11-02 PROCEDURE — 85730 THROMBOPLASTIN TIME PARTIAL: CPT

## 2019-11-02 PROCEDURE — 99284 EMERGENCY DEPT VISIT MOD MDM: CPT

## 2019-11-02 PROCEDURE — 86900 BLOOD TYPING SEROLOGIC ABO: CPT

## 2019-11-02 PROCEDURE — 76817 TRANSVAGINAL US OBSTETRIC: CPT

## 2019-11-02 PROCEDURE — 85610 PROTHROMBIN TIME: CPT

## 2019-11-02 PROCEDURE — 36415 COLL VENOUS BLD VENIPUNCTURE: CPT

## 2019-11-02 PROCEDURE — 76801 OB US < 14 WKS SINGLE FETUS: CPT

## 2019-11-02 PROCEDURE — 84703 CHORIONIC GONADOTROPIN ASSAY: CPT

## 2019-11-02 PROCEDURE — 84702 CHORIONIC GONADOTROPIN TEST: CPT

## 2019-11-02 PROCEDURE — 85025 COMPLETE CBC W/AUTO DIFF WBC: CPT

## 2019-11-02 NOTE — EVENT NOTE
ED Screening Note


Date of service: 19


Time: 22:15


ED Screening Note: 





This is a 24 y.o. F. that presents to the ER with abdominal pain radiating to 

back since waking this morning.





Patient is 7 weeks pregnant and followed by [facility].





Patient reports symptoms started with abdominal pain.  Vaginal bleeding started 

30 minutes ago.





LMP 2019  A1 





This initial assessment/diagnostic orders/clinical plan/treatment(s) is/are 

subject to change based on patients health status, clinical progression and re-

assessment by fellow clinical providers in the ED. Further treatment and workup 

at subsequent clinical providers discretion. Patient/guardian urged not to elope

from the ED as their condition may be serious if not clinically assessed and 

managed. 





Initial orders include: 





Labs & OB US

## 2019-11-03 VITALS — SYSTOLIC BLOOD PRESSURE: 118 MMHG | DIASTOLIC BLOOD PRESSURE: 72 MMHG

## 2019-11-03 LAB
APTT BLD: 32 SEC. (ref 24.2–36.6)
INR PPP: 1.25 (ref 0.87–1.13)

## 2019-11-03 NOTE — ULTRASOUND REPORT
ULTRASOUND OBSTETRIC 



INDICATION / CLINICAL INFORMATION:

vag bleeding, 7 weeks gest,   abdominal pain. Serum hCG level = 404

Clinical Gestational Age (GA): 6 weeks 4 days



TECHNIQUE:

Transabdominal and Transvaginal.



COMPARISON:

None available.



FINDINGS:

GESTATIONAL SAC: There is small sonolucency in the fundus of the uterus with mean sac diameter of 6.8
 mm which corresponds to 5 weeks 3 days gestational age. 

YOLK SAC: Not seen.



EMBRYO/FETUS: Not seen. 



ADNEXA: Left ovary appears normal. Small, slightly complex right ovarian cysts versus follicles.

FREE FLUID: None.



ADDITIONAL FINDINGS: None.



IMPRESSION:

1. Small sonolucency in the uterus which could represent very early gestational sac which would corre
spond to 5 weeks 3 days gestational age. No definite yolk sac or embryonic pole is identified.

2. Small, slightly complex right ovarian cysts versus follicles. No free fluid.



Signer Name: VINICIO Galarza MD 

Signed: 11/3/2019 12:09 AM

 Workstation Name: [id number]

## 2019-11-03 NOTE — EMERGENCY DEPARTMENT REPORT
ED Female  HPI





- General


Chief complaint: Vaginal Bleeding


Stated complaint: PREG 7WKS/VAG BLEEDING


Time Seen by Provider: 19 22:15


Source: patient


Mode of arrival: Ambulatory


Limitations: No Limitations





- History of Present Illness


Initial comments: 





Patient is 24-year-old female  4 para 3.  Patient presented to the ER 

complaining of vaginal bleeding and lower abdominal pain since last night.  

Patient stated that she is approximately 7 weeks pregnant.  She has been 

followed by [facility].  Patient denied any fever, chills, chest pain or 

shortness of breath.  Patient also denied any dizziness or loss of 

consciousness.


MD Complaint: vaginal bleeding, pelvic pain


-: Last night


Radiation: R flank


Severity: moderate


Severity scale (0 -10): 5


Consistency: constant


Are you Pregnant Now?: Yes





- Related Data


Sexually active: Yes


                                  Previous Rx's











 Medication  Instructions  Recorded  Last Taken  Type


 


HYDROcodone/APAP 5-325 [[place] 1 each PO Q6HR PRN #20 tablet 10/27/18 Unknown Rx





5/325]    


 


metroNIDAZOLE [Flagyl] 500 mg PO Q12HR #14 tab 19 Unknown Rx


 


traMADol [Ultram] 50 mg PO Q6HR PRN #10 tablet 19 Unknown Rx


 


ALBUTEROL NEB's [Proventil 0.083% 2.5 mg IH TID PRN #30 nebu 19 Unknown Rx





NEBS]    


 


Prednisone [predniSONE 10 mg 10 mg PO .TAPER #1 tab.ds.pk 19 Unknown Rx





(6-Day Pack, 21 Tabs)]    


 


guaiFENesin/CODEINE [Robitussin AC] 10 ml PO TID PRN #100 ml 19 Unknown Rx


 


levoFLOXacin [Levaquin TAB] 500 mg PO QDAY #7 tablet 19 Unknown Rx


 


Butalb/Acetaminophen/Caffeine 1 cap PO Q6HR PRN #15 cap 19 Unknown Rx





[Fioricet -40 mg CAP]    


 


Metoclopramide [Reglan] 10 mg PO QID PRN #30 tablet 19 Unknown Rx











                                    Allergies











Allergy/AdvReac Type Severity Reaction Status Date / Time


 


ibuprofen Allergy  Swelling Verified 07/15/18 02:17














ED Review of Systems


ROS: 


Stated complaint: PREG 7WKS/VAG BLEEDING


Other details as noted in HPI





Comment: All other systems reviewed and negative


Constitutional: denies: chills, fever


Respiratory: denies: cough, shortness of breath


Gastrointestinal: abdominal pain.  denies: nausea, vomiting


Genitourinary: abnormal menses





ED Past Medical Hx





- Past Medical History


Previous Medical History?: Yes


Hx Asthma: Yes


Additional medical history: Hypoglycemia





- Surgical History


Hx Cholecystectomy: Yes


Additional Surgical History: Surgery on Pancreas





- Social History


Smoking Status: Never Smoker


Substance Use Type: None





- Medications


Home Medications: 


                                Home Medications











 Medication  Instructions  Recorded  Confirmed  Last Taken  Type


 


HYDROcodone/APAP 5-325 [[place] 1 each PO Q6HR PRN #20 tablet 10/27/18  Unknown Rx





5/325]     


 


metroNIDAZOLE [Flagyl] 500 mg PO Q12HR #14 tab 19  Unknown Rx


 


traMADol [Ultram] 50 mg PO Q6HR PRN #10 tablet 19  Unknown Rx


 


ALBUTEROL NEB's [Proventil 0.083% 2.5 mg IH TID PRN #30 nebu 19  Unknown 

Rx





NEBS]     


 


Prednisone [predniSONE 10 mg 10 mg PO .TAPER #1 tab.ds.pk 19  Unknown Rx





(6-Day Pack, 21 Tabs)]     


 


guaiFENesin/CODEINE [Robitussin AC] 10 ml PO TID PRN #100 ml 19  Unknown 

Rx


 


levoFLOXacin [Levaquin TAB] 500 mg PO QDAY #7 tablet 19  Unknown Rx


 


Butalb/Acetaminophen/Caffeine 1 cap PO Q6HR PRN #15 cap 19  Unknown Rx





[Fioricet -40 mg CAP]     


 


Metoclopramide [Reglan] 10 mg PO QID PRN #30 tablet 19  Unknown Rx














ED Physical Exam





- General


Limitations: No Limitations


General appearance: alert, in no apparent distress





- Head


Head exam: Present: atraumatic, normocephalic, normal inspection





- Eye


Eye exam: Present: normal appearance, PERRL





- ENT


ENT exam: Present: normal exam, normal orophraynx, mucous membranes moist





- Neck


Neck exam: Present: normal inspection, full ROM.  Absent: tenderness, 

meningismus, lymphadenopathy, thyromegaly





- Respiratory


Respiratory exam: Present: normal lung sounds bilaterally





- Cardiovascular


Cardiovascular Exam: Present: regular rate, normal rhythm, normal heart sounds





- GI/Abdominal


GI/Abdominal exam: Present: soft, normal bowel sounds.  Absent: distended, 

tenderness, guarding, rebound, rigid, organomegaly, mass, bruit, pulsatile mass,

 hernia





- Extremities Exam


Extremities exam: Present: normal inspection, full ROM, normal capillary refill.

  Absent: pedal edema, calf tenderness





- Back Exam


Back exam: Present: normal inspection, full ROM.  Absent: CVA tenderness (R), 

CVA tenderness (L)





- Neurological Exam


Neurological exam: Present: alert, oriented X3, CN II-XII intact, normal gait, 

reflexes normal





- Psychiatric


Psychiatric exam: Present: normal mood





- Skin


Skin exam: Present: warm, intact, normal color





ED Course


                                   Vital Signs











  19





  22:04


 


Temperature 98.5 F


 


Pulse Rate 106 H


 


Respiratory 18





Rate 


 


Blood Pressure 148/76


 


O2 Sat by Pulse 98





Oximetry 














- Reevaluation(s)


Reevaluation #1: 





19 05:07


Patient received 1 L of normal saline, morphine and Zofran.  Patient stated that

 she is feeling much better.  Vital signs stable.  Patient discharged home in a 

stable condition.





ED Medical Decision Making





- Lab Data


Result diagrams: 


                                 19 22:33








- Radiology Data


Radiology results: report reviewed





- Medical Decision Making





Patient is 24-year-old female  4 para 3.  Patient presented to the ER 

complaining of vaginal bleeding and lower abdominal pain since last night.  

Patient stated that she is approximately 7 weeks pregnant.  She has been 

followed by [facility].  Patient denied any fever, chills, chest pain or 

shortness of breath.  Patient also denied any dizziness or loss of 

consciousness.





I discussed the patient was Dr. Milton Luis, OB,.  I informed him about the 

ultrasound report which showed possible ectopic pregnancy.  Dr. Luis advised 

patient can be discharged home and to follow-up with him in his office in 2 days

 for repeat of beta-hCG quantitative.


Critical care attestation.: 


If time is entered above; I have spent that time in minutes in the direct care 

of this critically ill patient, excluding procedure time.








ED Disposition


Clinical Impression: 


 Ectopic pregnancy





Disposition: DC-01 TO HOME OR SELFCARE


Is pt being admited?: No


Condition: Stable


Instructions:  Ectopic Pregnancy (ED)


Referrals: 


[facility] [Provider Group] - 3-5 Days

## 2019-11-04 ENCOUNTER — HOSPITAL ENCOUNTER (EMERGENCY)
Dept: HOSPITAL 5 - ED | Age: 24
Discharge: HOME | End: 2019-11-04
Payer: MEDICAID

## 2019-11-04 VITALS — DIASTOLIC BLOOD PRESSURE: 77 MMHG | SYSTOLIC BLOOD PRESSURE: 114 MMHG

## 2019-11-04 DIAGNOSIS — Z3A.08: ICD-10-CM

## 2019-11-04 DIAGNOSIS — O03.9: Primary | ICD-10-CM

## 2019-11-04 LAB
BILIRUB UR QL STRIP: (no result)
BLOOD UR QL VISUAL: (no result)
PH UR STRIP: 5 [PH] (ref 5–7)
PROT UR STRIP-MCNC: (no result) MG/DL
UROBILINOGEN UR-MCNC: < 2 MG/DL (ref ?–2)

## 2019-11-04 PROCEDURE — 36415 COLL VENOUS BLD VENIPUNCTURE: CPT

## 2019-11-04 PROCEDURE — 81001 URINALYSIS AUTO W/SCOPE: CPT

## 2019-11-04 PROCEDURE — 84702 CHORIONIC GONADOTROPIN TEST: CPT

## 2019-11-04 NOTE — EMERGENCY DEPARTMENT REPORT
Chief Complaint: Abdominal Pain


Stated Complaint: ECTOPIC PREGNANCY


Time Seen by Provider: 19 12:22





- HPI


History of Present Illness: 


Patient is 24-year-old female  4 para 3. who was evaluated 2 days ago  

for vaginal bleeding and lower abdominal pain.  Patient stated that LMP was 

2019  She  followed by [facility].  Patient denied any fever, 

chills, chest pain or shortness of breath.  Patient states that bleeding has 

resolved now to spotting and still having having pelvic pain.  She presented 

today because she was told to follow-up for repeat Quant











- ROS


Review of Systems: 





As seen in HPI





- Exam


Vital Signs: 


                                   Vital Signs











  19





  11:35


 


Temperature 98.1 F


 


Pulse Rate 93 H


 


Respiratory 16





Rate 


 


Blood Pressure 114/77


 


O2 Sat by Pulse 99





Oximetry 











Physical Exam: 





GENERAL: Alert and oriented x3, no apparent distress, Normal Gait, atraumatic.





ABDOMEN: No organomegaly was noted,Positive bowel sounds, soft, and non-

distended.  . Nontender to palpation on all Quadrants, NO CVA tenderness.





SKIN:  Warm and dry, No lesions, No ulceration or induration present.








MSE screening note: 


Focused history and physical exam performed.


Due to findings the following was ordered:











ED Medical Decision Making





- Medical Decision Making


24-year-old female presents to ED with incomplete 


ED course: Pt received a repeat quantitative in the ED today which was lower 

than 2 days ago ED


I discussed result findings with the patient.  I discussed with the patient that

her Quant is way lower than it was 2 days ago patient states since that 

incompleted  miscarriage


Vital signs normalized patient is in no acute distress.


I discussed with the patient if follow-up with her OB/GYN.  Discussed and 

emphasized the importance of following up with her OB/GYN for repeat Quant and a

repeat ultrasound within a week


I discussed all labs and ultrasound findings with the patient.


I discussed with the patient that he if bleeding worsens or new symptoms develop

to return to ED immediately


I discussed with the patient that it bleeding was to return or worsen to return 

to ED immediately.














ED Disposition for MSE


Clinical Impression: 


 Spontaneous , Inevitable spontaneous 





Disposition: - TO HOME OR SELFCARE


Is pt being admited?: No


Does the pt Need Aspirin: No


Condition: Stable


Instructions:  Spontaneous Miscarriage (ED), Abdominal Pain (ED)


Additional Instructions: 


Follow up with OBGyn within 1 week for follow up Blood work and US


If you have any worsening pain or bleeding please return to ED


Take medication as prescribed





Prescriptions: 


metroNIDAZOLE [Flagyl TAB] 500 mg PO Q12HR #14 tab


HYDROcodone/APAP 5-325 [[place] 5-325 mg TAB] 1 each PO Q6HR PRN #12 tablet


 PRN Reason: Pain


Referrals: 


[facility] [Outside] - 3-5 Days


[facility] [Provider Group] - 3-5 Days


Forms:  Accompanied Note, Work/School Release Form(ED)


Time of Disposition: 13:37

## 2019-11-04 NOTE — EVENT NOTE
ED Screening Note


Date of service: 11/04/19


Time: 12:03


ED Screening Note: 


24 y o female presents with pelvic


LMP 9/17/19





This initial assessment/diagnostic orders/clinical plan/treatment(s) is/are 

subject to change based on patients health status, clinical progression and re-

assessment by fellow clinical providers in the ED. Further treatment and workup 

at subsequent clinical providers discretion. Patient/guardian urged not to elope

from the ED as their condition may be serious if not clinically assessed and 

managed. 





Initial orders include:

## 2019-11-20 ENCOUNTER — HOSPITAL ENCOUNTER (EMERGENCY)
Dept: HOSPITAL 5 - ED | Age: 24
LOS: 1 days | Discharge: HOME | End: 2019-11-21
Payer: MEDICAID

## 2019-11-20 DIAGNOSIS — Z88.5: ICD-10-CM

## 2019-11-20 DIAGNOSIS — A69.20: Primary | ICD-10-CM

## 2019-11-20 DIAGNOSIS — Z90.49: ICD-10-CM

## 2019-11-20 DIAGNOSIS — Z79.899: ICD-10-CM

## 2019-11-20 DIAGNOSIS — J45.909: ICD-10-CM

## 2019-11-20 PROCEDURE — 99282 EMERGENCY DEPT VISIT SF MDM: CPT

## 2019-11-21 VITALS — DIASTOLIC BLOOD PRESSURE: 87 MMHG | SYSTOLIC BLOOD PRESSURE: 140 MMHG

## 2019-11-21 NOTE — EMERGENCY DEPARTMENT REPORT
ED Rash HPI





- HPI


Chief Complaint: Allergic Reaction


Stated Complaint: ALLERGIC REACTION


Time Seen by Provider: 11/21/19 00:45


Duration: Today


Location: Abdomen


Suspected Cause: Medication


Rash Symptoms: No Facial Swelling, No Tongue/Oral Swelling, No Breathing 

Difficulties, No Choking Sensation, No Wheezing/Dyspnea, No Peeling, No 

Blistering, No Fever, No Lightheaded, No Malaise, No Myalgias


Other History: 24-year-old female presents to the emergency room for concern of 

a rash on her chest left in finger.  Patient states that she was taking Flagyl 

and she started having this circular lesion on her chest.  Patient denies any 

difficulty swallowing no shortness of breath no chest pain.  Patient denies any 

alcohol use.





ED Review of Systems


ROS: 


Stated complaint: ALLERGIC REACTION


Other details as noted in HPI





Comment: All other systems reviewed and negative





ED Past Medical Hx





- Past Medical History


Previous Medical History?: Yes


Hx Asthma: Yes


Additional medical history: Hypoglycemia





- Surgical History


Past Surgical History?: Yes


Hx Cholecystectomy: Yes


Additional Surgical History: Surgery on Pancreas





- Social History


Smoking Status: Never Smoker


Substance Use Type: None





- Medications


Home Medications: 


                                Home Medications











 Medication  Instructions  Recorded  Confirmed  Last Taken  Type


 


traMADoL [Ultram] 50 mg PO Q6HR PRN #10 tablet 01/04/19  Unknown Rx


 


ALBUTEROL NEB's [Proventil 0.083% 2.5 mg IH TID PRN #30 nebu 08/02/19  Unknown 

Rx





NEBS]     


 


Prednisone [predniSONE 10 mg 10 mg PO .TAPER #1 tab.ds.pk 08/02/19  Unknown Rx





(6-Day Pack, 21 Tabs)]     


 


guaiFENesin/CODEINE [Robitussin AC] 10 ml PO TID PRN #100 ml 08/02/19  Unknown 

Rx


 


levoFLOXacin [Levaquin TAB] 500 mg PO QDAY #7 tablet 08/02/19  Unknown Rx


 


Butalb/Acetaminophen/Caffeine 1 cap PO Q6HR PRN #15 cap 08/17/19  Unknown Rx





[Fioricet -40 mg CAP]     


 


Metoclopramide [Reglan] 10 mg PO QID PRN #30 tablet 08/17/19  Unknown Rx


 


HYDROcodone/APAP 5-325 [[place] 1 each PO Q6HR PRN #12 tablet 11/04/19  Unknown Rx





5-325 mg TAB]     


 


metroNIDAZOLE [Flagyl TAB] 500 mg PO Q12HR #14 tab 11/04/19  Unknown Rx


 


Doxycycline Hyclate [Doxycycline 100 mg PO Q12HR #20 tab 11/21/19  Unknown Rx





Hyclate TAB]     














Rash Exam





- Exam


General: 


Vital signs noted. No distress. Alert and acting appropriately.





HEENT: No Periorbital Edema, No Conjuctival Injection, No Chemosis, No Perioral 

Edema, No Tongue Edema, No Uvular Edema, No Compromised Airway, No Drooling


Lungs: Yes Good Air Exchange (Normal Breath Sounds), No Wheezes, No Ronchi, No 

Stridor, No Cough, No Labored Respirations, No Retractions, No Use of Accessory 

Muscles, No Other Abnormal Lung Sounds


Skin: Yes Other (circular rash with bull's eye ring erythematous, edematous 

nontender to touch)


Other: Positive: Abdomen Normal





ED Course


                                   Vital Signs











  11/21/19





  00:00


 


Temperature 98.5 F


 


Pulse Rate 78


 


Respiratory 16





Rate 


 


Blood Pressure 140/87


 


O2 Sat by Pulse 100





Oximetry 














ED Medical Decision Making





- Medical Decision Making





24-year-old female presents to the emergency room for concern of a rash on her 

chest left in finger.  Patient states that she was taking Flagyl and she started

having this circular lesion on her chest.  Patient denies any difficulty 

swallowing no shortness of breath no chest pain.  Patient denies any alcohol 

use.











Patient appears to have a bullous rash on mid chest and left middle finger.  

Patient will be treated with doxycycline 100 mg by mouth twice a day for 10 

days.  Discussed patient to discontinue taking the metronidazole.


Critical care attestation.: 


If time is entered above; I have spent that time in minutes in the direct care 

of this critically ill patient, excluding procedure time.








ED Disposition


Clinical Impression: 


 Acute Lyme disease





Disposition: DC-01 TO HOME OR SELFCARE


Is pt being admited?: No


Does the pt Need Aspirin: No


Condition: Stable


Instructions:  Acute Rash (ED)


Additional Instructions: 


Discontinue using metronidazole.  Start doxycycline as prescribed.  Follow-up 

with her primary care provider for symptoms persist or gets worse..


Prescriptions: 


Doxycycline Hyclate [Doxycycline Hyclate TAB] 100 mg PO Q12HR #20 tab


Referrals: 


[facility] [Provider Group] - 3-5 Days

## 2020-06-17 ENCOUNTER — HOSPITAL ENCOUNTER (EMERGENCY)
Dept: HOSPITAL 5 - ED | Age: 25
Discharge: LEFT BEFORE DISCHARGE | End: 2020-06-17
Payer: MEDICAID

## 2020-06-17 DIAGNOSIS — Z53.21: ICD-10-CM

## 2020-06-17 DIAGNOSIS — O99.89: Primary | ICD-10-CM

## 2020-06-17 DIAGNOSIS — M54.9: ICD-10-CM

## 2020-06-17 DIAGNOSIS — Z3A.01: ICD-10-CM

## 2020-06-17 LAB
BACTERIA #/AREA URNS HPF: (no result) /HPF
BILIRUB UR QL STRIP: (no result)
BLOOD UR QL VISUAL: (no result)
MUCOUS THREADS #/AREA URNS HPF: (no result) /HPF
PH UR STRIP: 5 [PH] (ref 5–7)
PROT UR STRIP-MCNC: (no result) MG/DL
RBC #/AREA URNS HPF: 3 /HPF (ref 0–6)
UROBILINOGEN UR-MCNC: < 2 MG/DL (ref ?–2)
WBC #/AREA URNS HPF: 1 /HPF (ref 0–6)

## 2020-06-17 PROCEDURE — 76817 TRANSVAGINAL US OBSTETRIC: CPT

## 2020-06-17 PROCEDURE — 36415 COLL VENOUS BLD VENIPUNCTURE: CPT

## 2020-06-17 PROCEDURE — 81001 URINALYSIS AUTO W/SCOPE: CPT

## 2020-06-17 PROCEDURE — 76801 OB US < 14 WKS SINGLE FETUS: CPT

## 2020-06-17 PROCEDURE — 84702 CHORIONIC GONADOTROPIN TEST: CPT

## 2020-06-17 NOTE — EMERGENCY DEPARTMENT REPORT
Blank Doc





- Documentation


Documentation: 





25-year-old female that presents with back pain and bilateral pelvic pain. Was 

sent by [facility] for r/o ectopic pregnancy.  Denies any vaginal bleeding.





This initial assessment/diagnostic orders/clinical plan/treatment(s) is/are 

subject to change based on patient's health status, clinical progression and re-

assessment by fellow clinical providers in the ED.  Further treatment and workup

at subsequent clinical providers discretion.  Patient/guardians urged not to aan m

pe from the ED as their condition may be serious if not clinically assessed and 

managed.  Initial orders include:


1- Patient sent to [facility] for further evaluation and treatment


2- UA


3- US OB

## 2020-11-08 ENCOUNTER — HOSPITAL ENCOUNTER (OUTPATIENT)
Dept: HOSPITAL 5 - TRG | Age: 25
Discharge: HOME | End: 2020-11-08
Attending: OBSTETRICS & GYNECOLOGY
Payer: MEDICAID

## 2020-11-08 VITALS — SYSTOLIC BLOOD PRESSURE: 111 MMHG | DIASTOLIC BLOOD PRESSURE: 66 MMHG

## 2020-11-08 DIAGNOSIS — Z3A.24: ICD-10-CM

## 2020-11-08 DIAGNOSIS — O26.892: Primary | ICD-10-CM

## 2020-11-08 DIAGNOSIS — R10.2: ICD-10-CM

## 2020-11-08 PROCEDURE — 59025 FETAL NON-STRESS TEST: CPT

## 2021-02-18 NOTE — ULTRASOUND REPORT
ULTRASOUND OBSTETRIC 



INDICATION / CLINICAL INFORMATION:

CTX.

Clinical Gestational Age (GA): 38 weeks 5 days



TECHNIQUE:

Transabdominal.



COMPARISON:

None available.



FINDINGS:

There is a single intrauterine pregnancy. 



Biparietal Diameter = 9.5 cm = 38 weeks, 5 day(s).

Head Circumference = 33.5 cm = 38 weeks, 2 day(s).

Abdominal Circumference = 32.7 cm = 36 weeks, 5 day(s).

Femur Length = 7.7 cm = 39 weeks, 1 day(s).

Average Ultrasound Age (AUA) = 38 weeks, 2 day(s).



Fetal Heart Rate: 121  beats per minute. 

Estimated Fetal Birth Weight in grams (if calculated): 3286

Estimated Fetal Weight Growth Percentile (if calculated):



Fetal Position: cephalic. 

Amniotic Fluid Volume: normal 

Amniotic Fluid Index (MIROSLAVA) in cm (if calculated): 9.6.

Maternal Adnexa: Not visualized



IMPRESSION:

1. Single, living intrauterine pregnancy with estimated sonographic age of 38 weeks, 2  day(s).

2. No significant sonographic abnormality.



Signer Name: Haresh Thorpe MD 

Signed: 2/18/2021 12:58 AM

Workstation Name: [email or web address] (acquired by [employer] in [id number]

## 2021-02-18 NOTE — ULTRASOUND REPORT
ULTRASOUND FETAL BIOPHYSICAL PROFILE



INDICATION / CLINICAL INFORMATION:

CTX.



COMPARISON:

None available.



FINDINGS:



FETAL BREATHING MOVEMENT = 2

GROSS BODY MOVEMENT = 2

FETAL TONE = 2

QUALITATIVE AMNIOTIC FLUID VOLUME = 2



TOTAL BIOPHYSICAL SCORE = 8/8



AMNIOTIC FLUID INDEX (cm) =  9.6

PRESENTATION: Cephalic. 

FETAL HEART RATE (beats per minute): 121 



ADDITIONAL FINDINGS: None.



IMPRESSION:

1. Fetal Biophysical Score = 8/8



Signer Name: Haresh Thorpe MD 

Signed: 2/18/2021 12:54 AM

Workstation Name: [id number]

## 2022-07-30 NOTE — XRAY REPORT
RIGHT FOOT 3 VIEW(S)



INDICATION / CLINICAL INFORMATION: right foot injury/big toe



COMPARISON: None available.

 

FINDINGS:



BONES / JOINT(S): No acute fracture or subluxation. No significant arthritis.

SOFT TISSUES: Mild soft tissue swelling of the great toe.



ADDITIONAL FINDINGS: None.



IMPRESSION:

1. No fracture. Mild soft tissue swelling of the great toe.



Signer Name: VINICIO Galarza MD 

Signed: 7/30/2022 10:14 AM

Workstation Name: [id number]

## 2023-05-08 NOTE — ULTRASOUND REPORT
OB ultrasound first trimester



INDICATION: 

Pelvic pain, pregnant





Transabdominal and transvaginal imaging is performed.





FINDINGS:

The uterus measures 8.4 cm in length. Endometrial stripe measures 15 mm. There is a 3.3 mm hypoechoic
 focus within the endometrial cavity possibly representing a small gestational sac. This would correl
ate with a 5 week gestation. No fetal pole or heartbeat is identified at this time.



The right ovary measures 3.3 cm and the left ovary measures 3.4 cm. There is a 2.6 cm complex cyst in
 the left ovary.



No free fluid is seen.



IMPRESSION:

There is a possible small gestational sac within the uterus. No fetal pole or heartbeat is identified
 at this time. Correlation with serum beta hCG levels is recommended. Follow-up ultrasound should be 
obtained as clinically warranted to evaluate for viable IUP.





Signer Name: Arnold Hinjoosa MD 

Signed: 6/17/2020 4:43 PM

Workstation Name: [id number]
OB ultrasound first trimester



INDICATION: 

Pelvic pain, pregnant





Transabdominal and transvaginal imaging is performed.





FINDINGS:

The uterus measures 8.4 cm in length. Endometrial stripe measures 15 mm. There is a 3.3 mm hypoechoic
 focus within the endometrial cavity possibly representing a small gestational sac. This would correl
ate with a 5 week gestation. No fetal pole or heartbeat is identified at this time.



The right ovary measures 3.3 cm and the left ovary measures 3.4 cm. There is a 2.6 cm complex cyst in
 the left ovary.



No free fluid is seen.



IMPRESSION:

There is a possible small gestational sac within the uterus. No fetal pole or heartbeat is identified
 at this time. Correlation with serum beta hCG levels is recommended. Follow-up ultrasound should be 
obtained as clinically warranted to evaluate for viable IUP.





Signer Name: Arnold Hinojosa MD 

Signed: 6/17/2020 4:43 PM

Workstation Name: [id number]
good balance
no

## 2024-10-16 NOTE — EMERGENCY DEPARTMENT REPORT
ED Lower Extremity HPI





- General


Chief Complaint: Extremity Injury, Lower


Stated Complaint: RIGHT TOE POSSIBLY BROKEN


Time Seen by Provider: 07/30/22 14:20


Source: patient


Mode of arrival: Ambulatory


Limitations: No Limitations





- History of Present Illness


Initial Comments: 


Is a 27-year-old female who presents for left great toe pain states she dropped 

a metal bar on her foot at work 1 day ago.  Now 5/10 pain.  Patient states only 

partial weightbearing.  There is mild erythema no ecchymosis no deformity.  No 

open wound abrasion or laceration noted.  Patient drove self to ED today.  And 

ambulated on her own power chilling.  She denies other injury.  Patient did 

report injury to supervisor at work.





MD Complaint: foot injury





- Related Data


                                Home Medications











 Medication  Instructions  Recorded  Confirmed  Last Taken


 


Prenatal Vitamin 1 tab PO DAILY 11/08/20 02/23/21 11/08/20








                                  Previous Rx's











 Medication  Instructions  Recorded  Last Taken  Type


 


Acetaminophen/Codeine [Tylenol 1 tab PO Q6H PRN #12 tab 07/30/22 Unknown Rx





/Codeine # 3 tab]    











                                    Allergies











Allergy/AdvReac Type Severity Reaction Status Date / Time


 


metronidazole [From Flagyl] Allergy Severe Hives Verified 11/08/20 18:19


 


ibuprofen Allergy  Swelling Verified 07/15/18 02:17














ED Review of Systems


ROS: 


Stated complaint: RIGHT TOE POSSIBLY BROKEN


Other details as noted in HPI





Constitutional: denies: chills, fever


Eyes: denies: eye pain, eye discharge, vision change


ENT: denies: ear pain, throat pain


Respiratory: denies: cough, shortness of breath, wheezing


Cardiovascular: denies: chest pain, palpitations


Endocrine: no symptoms reported


Gastrointestinal: denies: abdominal pain, nausea, diarrhea


Genitourinary: denies: urgency, dysuria, discharge


Musculoskeletal: other.  denies: back pain, joint swelling, arthralgia


Skin: denies: rash, lesions


Neurological: denies: headache, weakness, paresthesias


Psychiatric: denies: anxiety, depression


Hematological/Lymphatic: denies: easy bleeding, easy bruising





ED Past Medical Hx





- Past Medical History


Previous Medical History?: Yes


Hx Hypertension: No


Hx Congestive Heart Failure: No


Hx Diabetes: No


Hx Deep Vein Thrombosis: No


Hx Renal Disease: No


Hx Sickle Cell Disease: No


Hx Seizures: No


Hx Asthma: Yes


Hx COPD: No


Hx HIV: No


Additional medical history: Hypoglycemia





- Surgical History


Past Surgical History?: Yes


Hx Cholecystectomy: Yes


Additional Surgical History: Surgery on Pancreas





- Social History


Smoking Status: Former Smoker





- Medications


Home Medications: 


                                Home Medications











 Medication  Instructions  Recorded  Confirmed  Last Taken  Type


 


Prenatal Vitamin 1 tab PO DAILY 11/08/20 02/23/21 11/08/20 History


 


Acetaminophen/Codeine [Tylenol 1 tab PO Q6H PRN #12 tab 07/30/22  Unknown Rx





/Codeine # 3 tab]     














ED Physical Exam





- General


Limitations: No Limitations


General appearance: alert, in no apparent distress





- Head


Head exam: Present: normocephalic, normal inspection





- Eye


Eye exam: Present: EOMI


Pupils: Present: normal accommodation





- ENT


ENT exam: Present: mucous membranes moist





- Neck


Neck exam: Present: normal inspection, full ROM.  Absent: tenderness





- Respiratory


Respiratory exam: Present: normal lung sounds bilaterally.  Absent: respiratory 

distress, wheezes





- Cardiovascular


Cardiovascular Exam: Present: regular rate, normal rhythm, normal heart sounds. 

Absent: systolic murmur, diastolic murmur, rubs, gallop





- GI/Abdominal


GI/Abdominal exam: Present: soft, normal bowel sounds.  Absent: distended, 

tenderness





- Rectal


Rectal exam: Present: deferred





- Extremities Exam


Extremities exam: Present: full ROM, normal capillary refill





- Expanded Lower Extremity Exam


  ** Left


Foot/Toe exam: Present: full ROM, tenderness, swelling, erythema.  Absent: 

abrasion, laceration (Left great toe), ecchymosis, deformity, dislocation, 

amputation, puncture wound, foreign body, calcaneal tenderness, tenderness at 

base of 5th metatarsal, nail avulsion


Neuro vascular tendon exam: Absent: pulse deficit, motor deficit, sensory 

deficit, tendon deficit, foot drop


Gait: Positive: observed and limited by pain





- Back Exam


Back exam: Present: normal inspection, full ROM.  Absent: paraspinal tenderness,

vertebral tenderness





- Neurological Exam


Neurological exam: Present: alert, oriented X3, CN II-XII intact, reflexes 

normal.  Absent: motor sensory deficit





- Expanded Neurological Exam


  ** Expanded


Patient oriented to: Present: person, place, time


Motor strength exam: RUE: 5, LUE: 5, RLE: 5, LLE: 5


Best Eye Response ([place]): (4) open spontaneously


Best Motor Response ([place]): (6) obeys commands


Best Verbal Response ([place]): (5) oriented


[place] Total: 15





- Psychiatric


Psychiatric exam: Present: normal affect, normal mood





- Skin


Skin exam: Present: warm, dry, intact, normal color.  Absent: rash





ED Course





                                   Vital Signs











  07/30/22





  09:40


 


Temperature 98.3 F


 


Pulse Rate 88


 


Respiratory 18





Rate 


 


Blood Pressure 121/83


 


O2 Sat by Pulse 98





Oximetry 














ED Lower Extremity MDM





- Radiology Data


Radiology results: report reviewed, image reviewed


 


RIGHT FOOT 3 VIEW(S)  


 


 INDICATION / CLINICAL INFORMATION: right foot injury/big toe  


 


 COMPARISON: None available.  


 


 FINDINGS:  


 


 BONES / JOINT(S): No acute fracture or subluxation. No significant arthritis.  


 SOFT TISSUES: Mild soft tissue swelling of the great toe.  


 


 ADDITIONAL FINDINGS: None.  


 


 IMPRESSION:  


 1. No fracture. Mild soft tissue swelling of the great toe.  


 


 Signer Name: VINICIO Galarza MD   


 Signed: 7/30/2022 10:14 AM  


 Workstation Name: [id number]   


 


 


Transcribed By: DT  


Dictated By: Sajan Galarza MD  


Electronically Authenticated By: Sajan Galarza MD    


Signed Date/Time: 07/30/22 1014                                


 


 


 


DD/DT: 07/30/22 1014                                                            

 


TD/TT:











- Medical Decision Making


Left foot x-ray no fracture soft tissue swelling ecchymosis distal pulses intact

flexion extension are intact there is no abrasion laceration or bleeding.  Plan 

Ortho shoe, crutches, follow-up with Workmen's Comp. doctor in 2 days.  Return 

to the emergency department for symptoms worsen.  Patient verbalized agreement 

and understanding of discharge plan patient DC'd home in stable condition at 

this time.





Critical care attestation.: 


If time is entered above; I have spent that time in minutes in the direct care 

of this critically ill patient, excluding procedure time.








ED Disposition


Clinical Impression: 


 Contusion of left foot





Sprain of toe, fifth, left


Qualifiers:


 Encounter type: initial encounter Qualified Code(s): S93.505A - Unspecified 

sprain of left lesser toe(s), initial encounter





Disposition: 01 HOME / SELF CARE / HOMELESS


Is pt being admited?: No


Does the pt Need Aspirin: No


Condition: Stable


Instructions:  How to Use Cold Therapy, Easy-to-Read


Additional Instructions: 


Take medication as prescribed for pain, RICE therapy as directed.  Use crutches 

as directed.  Follow-up with your Workmen's Comp. doctor as directed by 

employer.  Follow-up with your doctor in 2 to 3 days.


Prescriptions: 


Acetaminophen/Codeine [Tylenol /Codeine # 3 tab] 1 tab PO Q6H PRN #12 tab


 PRN Reason: pain


Referrals: 


MAHIN ESTRADA MD [Primary Care Provider] - 3-5 Days


Forms:  Work/School Release Form(ED)


Time of Disposition: 15:17
no